# Patient Record
Sex: FEMALE | Race: BLACK OR AFRICAN AMERICAN | URBAN - METROPOLITAN AREA
[De-identification: names, ages, dates, MRNs, and addresses within clinical notes are randomized per-mention and may not be internally consistent; named-entity substitution may affect disease eponyms.]

---

## 2019-01-02 ENCOUNTER — APPOINTMENT (OUTPATIENT)
Dept: URBAN - METROPOLITAN AREA SURGERY 9 | Age: 79
Setting detail: DERMATOLOGY
End: 2019-01-02

## 2019-01-02 DIAGNOSIS — L29.8 OTHER PRURITUS: ICD-10-CM

## 2019-01-02 DIAGNOSIS — L81.0 POSTINFLAMMATORY HYPERPIGMENTATION: ICD-10-CM

## 2019-01-02 PROCEDURE — OTHER TREATMENT REGIMEN: OTHER

## 2019-01-02 PROCEDURE — 99203 OFFICE O/P NEW LOW 30 MIN: CPT

## 2019-01-02 PROCEDURE — OTHER PRESCRIPTION: OTHER

## 2019-01-02 PROCEDURE — OTHER OTHER: OTHER

## 2019-01-02 RX ORDER — TRIAMCINOLONE ACETONIDE 1 MG/G
CREAM TOPICAL
Qty: 1 | Refills: 0 | Status: ERX | COMMUNITY
Start: 2019-01-02

## 2019-01-02 ASSESSMENT — LOCATION SIMPLE DESCRIPTION DERM
LOCATION SIMPLE: LEFT POSTERIOR UPPER ARM
LOCATION SIMPLE: ABDOMEN
LOCATION SIMPLE: UPPER BACK
LOCATION SIMPLE: RIGHT POSTERIOR UPPER ARM
LOCATION SIMPLE: RIGHT FOREARM
LOCATION SIMPLE: LEFT FOREARM

## 2019-01-02 ASSESSMENT — LOCATION DETAILED DESCRIPTION DERM
LOCATION DETAILED: LEFT PROXIMAL POSTERIOR UPPER ARM
LOCATION DETAILED: LEFT PROXIMAL DORSAL FOREARM
LOCATION DETAILED: RIGHT PROXIMAL DORSAL FOREARM
LOCATION DETAILED: EPIGASTRIC SKIN
LOCATION DETAILED: RIGHT PROXIMAL POSTERIOR UPPER ARM
LOCATION DETAILED: INFERIOR THORACIC SPINE

## 2019-01-02 ASSESSMENT — LOCATION ZONE DERM
LOCATION ZONE: TRUNK
LOCATION ZONE: ARM

## 2019-01-02 NOTE — PROCEDURE: TREATMENT REGIMEN
Initiate Treatment: Triamcinolone as directed
Plan: Patient states she just had blood work done a couple days ago and she will bring in so we can review
Discontinue Regimen: Dial soap
Otc Regimen: Dove unscented soap. Moisturize
Detail Level: Zone

## 2019-01-02 NOTE — PROCEDURE: OTHER
Detail Level: Simple
Note Text (......Xxx Chief Complaint.): This diagnosis correlates with the
Other (Free Text): From scratching
Detail Level: Detailed
Other (Free Text): Many excoriations, possibly a few early prurigo nodules. No bites. Skin is dry

## 2019-01-25 ENCOUNTER — APPOINTMENT (OUTPATIENT)
Dept: URBAN - METROPOLITAN AREA SURGERY 9 | Age: 79
Setting detail: DERMATOLOGY
End: 2019-01-29

## 2019-01-25 DIAGNOSIS — L30.8 OTHER SPECIFIED DERMATITIS: ICD-10-CM

## 2019-01-25 PROBLEM — L30.9 DERMATITIS, UNSPECIFIED: Status: ACTIVE | Noted: 2019-01-25

## 2019-01-25 PROCEDURE — 11104 PUNCH BX SKIN SINGLE LESION: CPT

## 2019-01-25 PROCEDURE — OTHER OTHER: OTHER

## 2019-01-25 PROCEDURE — OTHER BIOPSY BY PUNCH METHOD: OTHER

## 2019-01-25 PROCEDURE — OTHER PATHOLOGY BILLING: OTHER

## 2019-01-25 PROCEDURE — 88305 TISSUE EXAM BY PATHOLOGIST: CPT | Mod: TC

## 2019-01-25 ASSESSMENT — LOCATION SIMPLE DESCRIPTION DERM: LOCATION SIMPLE: RIGHT FOREARM

## 2019-01-25 ASSESSMENT — LOCATION ZONE DERM: LOCATION ZONE: ARM

## 2019-01-25 ASSESSMENT — LOCATION DETAILED DESCRIPTION DERM: LOCATION DETAILED: RIGHT VENTRAL PROXIMAL FOREARM

## 2019-01-25 NOTE — PROCEDURE: PATHOLOGY BILLING
Immunohistochemistry ([id number] and [id number]) billing is not performed here. Please use the Immunohistochemistry Stain Billing plan to accomplish this. Immunohistochemistry ([id number] and [id number]) billing is not performed here. Please use the Immunohistochemistry Stain Billing plan to accomplish this.

## 2019-01-25 NOTE — PROCEDURE: BIOPSY BY PUNCH METHOD
Render Post-Care Instructions In Note?: no
Dressing: bandage
Anesthesia Type: 1% lidocaine with epinephrine
Billing Type: Third-Party Bill
Additional Anesthesia Volume In Cc (Will Not Render If 0): 0
Detail Level: Detailed
Suture Removal: 14 days
Anesthesia Volume In Cc (Will Not Render If 0): 1
Wound Care: Vaseline
Punch Size In Mm: 4
Post-Care Instructions: Reviewed post-care instructions. Keep the biopsy site dry overnight, and then apply Vaseline and bandage daily until healed.
Home Suture Removal Text: Patient was provided a home suture removal kit and will remove their sutures at home.  If they have any questions or difficulties they will call the office.
Epidermal Sutures: 5-0 Nylon
Hemostasis: None
Biopsy Type: H and E
Notification Instructions: Patient will be notified of biopsy results. However, patient instructed to call the office if not contacted within 2 weeks.
Lab: -[id number]
Was A Bandage Applied: Yes
Consent: Written consent was obtained and risks were reviewed including but not limited to scarring, infection, bleeding, and allergy to anesthesia.

## 2019-01-25 NOTE — PROCEDURE: OTHER
Note Text (......Xxx Chief Complaint.): This diagnosis correlates with the
Detail Level: Zone
Other (Free Text): erythematous dermal papules present today. Upper back and forearms most prominently. Did not have these last visit. No burrows noted. \\n\\nShe will look on the Internet for instruction on searching for bedbugs.

## 2019-02-06 ENCOUNTER — APPOINTMENT (OUTPATIENT)
Dept: URBAN - METROPOLITAN AREA SURGERY 9 | Age: 79
Setting detail: DERMATOLOGY
End: 2019-02-06

## 2019-02-06 DIAGNOSIS — Z48.02 ENCOUNTER FOR REMOVAL OF SUTURES: ICD-10-CM

## 2019-02-06 DIAGNOSIS — B86 SCABIES: ICD-10-CM

## 2019-02-06 PROBLEM — L30.9 DERMATITIS, UNSPECIFIED: Status: ACTIVE | Noted: 2019-02-06

## 2019-02-06 PROCEDURE — OTHER TREATMENT REGIMEN: OTHER

## 2019-02-06 PROCEDURE — OTHER OTHER: OTHER

## 2019-02-06 PROCEDURE — 99213 OFFICE O/P EST LOW 20 MIN: CPT

## 2019-02-06 PROCEDURE — OTHER PRESCRIPTION: OTHER

## 2019-02-06 PROCEDURE — 99024 POSTOP FOLLOW-UP VISIT: CPT

## 2019-02-06 PROCEDURE — OTHER SUTURE REMOVAL (GLOBAL PERIOD): OTHER

## 2019-02-06 RX ORDER — PERMETHRIN 50 MG/100G
CREAM TOPICAL
Qty: 1 | Refills: 1 | Status: ERX | COMMUNITY
Start: 2019-02-06

## 2019-02-06 ASSESSMENT — LOCATION SIMPLE DESCRIPTION DERM: LOCATION SIMPLE: RIGHT FOREARM

## 2019-02-06 ASSESSMENT — LOCATION DETAILED DESCRIPTION DERM: LOCATION DETAILED: RIGHT VENTRAL PROXIMAL FOREARM

## 2019-02-06 ASSESSMENT — LOCATION ZONE DERM: LOCATION ZONE: ARM

## 2019-02-06 NOTE — PROCEDURE: TREATMENT REGIMEN
Otc Regimen: Claritin or Allegra in AM, Benadryl at night if needed.\\nHas CeraVe anti itch cream
Detail Level: Simple

## 2019-02-06 NOTE — PROCEDURE: SUTURE REMOVAL (GLOBAL PERIOD)
Add 88289 Cpt? (Important Note: In 2017 The Use Of 13942 Is Being Tracked By Cms To Determine Future Global Period Reimbursement For Global Periods): yes
Detail Level: Detailed

## 2019-02-06 NOTE — PROCEDURE: OTHER
Note Text (......Xxx Chief Complaint.): This diagnosis correlates with the
Detail Level: Zone
Other (Free Text): She is concerned with lymphoma due to chronic coughing and itching. Will see Dr. Tenorio and discuss pulmonary evaluation.\\nShe also had mildly decreased wbc count at 4.0; 2016 level was 4.4. She was given a copy to take with her to discuss with Dr. Tenorio also.\\n\\nShe found no bedbugs with her search. Reviewed evaluation by an  may be worth considering if she continues to have problems.\\nAnother consideration would be folliculitis, but she has no pustules. Some of the papules are folliculocentric. Reevaluate at follow-up in 2-3wks